# Patient Record
(demographics unavailable — no encounter records)

---

## 2024-11-18 NOTE — REASON FOR VISIT
[Symptom and Test Evaluation] : symptom and test evaluation [FreeTextEntry1] : 62 year old F with HTN, knee arthritis who presents for f/u.  Meds: losartan 100, amlo 5. She is not taking Toprol 25 daily  1 week event monitor showed sinus rhythm , avg 85 bpm, <0.01% PACs, 0.51% PVCs, no significant arrhythmia identified.

## 2024-11-18 NOTE — HISTORY OF PRESENT ILLNESS
[FreeTextEntry1] : Pt reports rare episodes of palpitations since last visit. She only took Toprol twice, but feels like it does help with her palpitations. She otherwise remains stable.  10/4/24 - Pt reports 1 month of intermittent palpitations and SOB/"feeling suffocated" that occur randomly. When it comes on, she feels fast heartbeats and then starts to feel out of breath. It sometimes occurs at night when she is laying in bed. It can last few seconds to minutes. It is not related to exertion. She denies coffee/tea/caffeine. She denies chest pain, dizziness, LOC, orthopnea, PND, or LE edema. Of note, she underwent R bunion surgery on his R foot in 8/2024, limiting her walking. No f/c, recent travel, or leg pain.

## 2024-11-18 NOTE — ASSESSMENT
[FreeTextEntry1] : 62 year old F with HTN, knee arthritis who presents for f/u.  1) Palpitations 2) Dyspnea 3) HTN -  TTE 10/11/24 showed normal LVEF 68% with mild LVH, grade 1 DD, normal RV size/function and no significant valvular disease - 1 week event monitor showed sinus rhythm , avg 85 bpm, <0.01% PACs, 0.51% PVCs, no significant arrhythmia identified.  - Discussed that if she has persistent/more frequent palpitations, she may take Toprol 25mg XL daily. She wants to hold off taking it for now and monitor her symptoms. She will restart if she becomes symptomatic again.  4) Follow-up, 3 months

## 2025-01-31 NOTE — ASSESSMENT
[FreeTextEntry1] : 62 year old F with HTN, knee arthritis who presents for f/u.  1) Palpitations 2) HTN - TTE 10/11/24 showed normal LVEF 68% with mild LVH, grade 1 DD, normal RV size/function and no significant valvular disease - 1 week event monitor showed sinus rhythm , avg 85 bpm, <0.01% PACs, 0.51% PVCs, no significant arrhythmia identified. - EKG today showed sinus rhythm without significant abnormalities - Continue Toprol 12.5mg once daily. She wants to try to come off metoprolol. Discussed she can try to hold it and see if palpitations return. If symptoms return, she should resume metoprolol at that time.  3) Follow-up, 6 months or sooner if symptomatic

## 2025-01-31 NOTE — REASON FOR VISIT
[Symptom and Test Evaluation] : symptom and test evaluation [Arrhythmia/ECG Abnorrmalities] : arrhythmia/ECG abnormalities [FreeTextEntry3] : Dr. Bronson [FreeTextEntry1] : 62 year old F with HTN, knee arthritis who presents for f/u.  Meds: losartan 100, amlo 5. She is not taking Toprol 25 daily  1 week event monitor showed sinus rhythm , avg 85 bpm, <0.01% PACs, 0.51% PVCs, no significant arrhythmia identified.

## 2025-03-18 NOTE — ASSESSMENT
[FreeTextEntry1] : 62 year old F with HTN, knee arthritis who presents for f/u.  1) Palpitations 2) Chest discomfort, not related to exertion 3) HTN - TTE 10/11/24 showed normal LVEF 68% with mild LVH, grade 1 DD, normal RV size/function and no significant valvular disease - 1 week event monitor showed sinus rhythm , avg 85 bpm, <0.01% PACs, 0.51% PVCs, no significant arrhythmia identified. - Continue Toprol 12.5mg once daily - I advised pt to undergo treadmill stress to r/o ischemia; pt did 7 min Isidro protocol, did not have CP and had 1.0mm upsloping ST depressions that are equivocal for ischemia. Given her risk factors and equivocal stress, I advised pt to undergo coronary CT. Pt prefers to monitor for now.  4) Follow-up, 6 months or sooner if symptomatic

## 2025-03-18 NOTE — PHYSICAL EXAM
[Well Developed] : well developed [Well Nourished] : well nourished [No Acute Distress] : no acute distress [Normal Conjunctiva] : normal conjunctiva [Normal Venous Pressure] : normal venous pressure [No Carotid Bruit] : no carotid bruit [Normal S1, S2] : normal S1, S2 [No Murmur] : no murmur [No Rub] : no rub [No Gallop] : no gallop [Clear Lung Fields] : clear lung fields [Good Air Entry] : good air entry [No Respiratory Distress] : no respiratory distress  [Soft] : abdomen soft [Non Tender] : non-tender [No Masses/organomegaly] : no masses/organomegaly [Normal Bowel Sounds] : normal bowel sounds [Normal Gait] : normal gait [No Edema] : no edema [No Cyanosis] : no cyanosis [No Clubbing] : no clubbing [No Varicosities] : no varicosities [No Rash] : no rash [No Skin Lesions] : no skin lesions [Moves all extremities] : moves all extremities [No Focal Deficits] : no focal deficits [Normal Speech] : normal speech [Alert and Oriented] : alert and oriented [Normal memory] : normal memory Rituxan Pregnancy And Lactation Text: This medication is Pregnancy Category C and it isn't know if it is safe during pregnancy. It is unknown if this medication is excreted in breast milk but similar antibodies are known to be excreted.

## 2025-03-18 NOTE — HISTORY OF PRESENT ILLNESS
[FreeTextEntry1] : Pt reports mid-sternal chest discomfort when laying in bed. She has no symptoms with exertion. No SOB, palpitation, dizziness, LOC, orthopnea, PND, or LE edema.  1/31/25 - Pt reports feeling stable since last visit. She has minimal palpitations at this time. She has been taking Toprol 12.5mg once daily. She denies chest pain, SOB, dizziness, or LOC. She takes BP meds as prescribed by Dr. Bronson.  11/18/24 - Pt reports rare episodes of palpitations since last visit. She only took Toprol twice, but feels like it does help with her palpitations. She otherwise remains stable.  10/4/24 - Pt reports 1 month of intermittent palpitations and SOB/"feeling suffocated" that occur randomly. When it comes on, she feels fast heartbeats and then starts to feel out of breath. It sometimes occurs at night when she is laying in bed. It can last few seconds to minutes. It is not related to exertion. She denies coffee/tea/caffeine. She denies chest pain, dizziness, LOC, orthopnea, PND, or LE edema. Of note, she underwent R bunion surgery on his R foot in 8/2024, limiting her walking. No f/c, recent travel, or leg pain.

## 2025-03-18 NOTE — REASON FOR VISIT
[Symptom and Test Evaluation] : symptom and test evaluation [Hypertension] : hypertension [FreeTextEntry1] : 62 year old F with HTN, knee arthritis who presents for f/u.  Meds: losartan 100, amlo 5. She is taking Toprol 12.5mg daily  1 week event monitor showed sinus rhythm , avg 85 bpm, <0.01% PACs, 0.51% PVCs, no significant arrhythmia identified.